# Patient Record
Sex: MALE | Race: BLACK OR AFRICAN AMERICAN | Employment: OTHER | URBAN - METROPOLITAN AREA
[De-identification: names, ages, dates, MRNs, and addresses within clinical notes are randomized per-mention and may not be internally consistent; named-entity substitution may affect disease eponyms.]

---

## 2023-03-16 ENCOUNTER — ANESTHESIA (OUTPATIENT)
Dept: ENDOSCOPY | Age: 68
End: 2023-03-16
Payer: MEDICARE

## 2023-03-16 ENCOUNTER — HOSPITAL ENCOUNTER (OUTPATIENT)
Age: 68
Setting detail: OUTPATIENT SURGERY
Discharge: HOME | End: 2023-03-16
Attending: INTERNAL MEDICINE | Admitting: INTERNAL MEDICINE
Payer: MEDICARE

## 2023-03-16 ENCOUNTER — ANESTHESIA EVENT (OUTPATIENT)
Dept: ENDOSCOPY | Age: 68
End: 2023-03-16
Payer: MEDICARE

## 2023-03-16 VITALS
RESPIRATION RATE: 14 BRPM | BODY MASS INDEX: 29.92 KG/M2 | DIASTOLIC BLOOD PRESSURE: 73 MMHG | OXYGEN SATURATION: 98 % | WEIGHT: 209 LBS | HEART RATE: 69 BPM | TEMPERATURE: 97.4 F | HEIGHT: 70 IN | SYSTOLIC BLOOD PRESSURE: 139 MMHG

## 2023-03-16 PROCEDURE — 74011250636 HC RX REV CODE- 250/636: Performed by: INTERNAL MEDICINE

## 2023-03-16 PROCEDURE — 74011250636 HC RX REV CODE- 250/636: Performed by: NURSE ANESTHETIST, CERTIFIED REGISTERED

## 2023-03-16 PROCEDURE — 74011000250 HC RX REV CODE- 250: Performed by: NURSE ANESTHETIST, CERTIFIED REGISTERED

## 2023-03-16 PROCEDURE — 76060000031 HC ANESTHESIA FIRST 0.5 HR: Performed by: INTERNAL MEDICINE

## 2023-03-16 PROCEDURE — 88305 TISSUE EXAM BY PATHOLOGIST: CPT

## 2023-03-16 PROCEDURE — 2709999900 HC NON-CHARGEABLE SUPPLY: Performed by: INTERNAL MEDICINE

## 2023-03-16 PROCEDURE — 77030013992 HC SNR POLYP ENDOSC BSC -B: Performed by: INTERNAL MEDICINE

## 2023-03-16 PROCEDURE — 76040000019: Performed by: INTERNAL MEDICINE

## 2023-03-16 RX ORDER — PROPOFOL 10 MG/ML
INJECTION, EMULSION INTRAVENOUS AS NEEDED
Status: DISCONTINUED | OUTPATIENT
Start: 2023-03-16 | End: 2023-03-16 | Stop reason: HOSPADM

## 2023-03-16 RX ORDER — FLUMAZENIL 0.1 MG/ML
0.2 INJECTION INTRAVENOUS
Status: DISCONTINUED | OUTPATIENT
Start: 2023-03-16 | End: 2023-03-16 | Stop reason: HOSPADM

## 2023-03-16 RX ORDER — LIDOCAINE HYDROCHLORIDE 20 MG/ML
INJECTION, SOLUTION EPIDURAL; INFILTRATION; INTRACAUDAL; PERINEURAL AS NEEDED
Status: DISCONTINUED | OUTPATIENT
Start: 2023-03-16 | End: 2023-03-16 | Stop reason: HOSPADM

## 2023-03-16 RX ORDER — ATROPINE SULFATE 0.1 MG/ML
0.5 INJECTION INTRAVENOUS
Status: DISCONTINUED | OUTPATIENT
Start: 2023-03-16 | End: 2023-03-16 | Stop reason: HOSPADM

## 2023-03-16 RX ORDER — DEXTROMETHORPHAN/PSEUDOEPHED 2.5-7.5/.8
1.2 DROPS ORAL
Status: DISCONTINUED | OUTPATIENT
Start: 2023-03-16 | End: 2023-03-16 | Stop reason: HOSPADM

## 2023-03-16 RX ORDER — EPINEPHRINE 0.1 MG/ML
1 INJECTION INTRACARDIAC; INTRAVENOUS
Status: DISCONTINUED | OUTPATIENT
Start: 2023-03-16 | End: 2023-03-16 | Stop reason: HOSPADM

## 2023-03-16 RX ORDER — MIDAZOLAM HYDROCHLORIDE 1 MG/ML
.25-5 INJECTION, SOLUTION INTRAMUSCULAR; INTRAVENOUS
Status: DISCONTINUED | OUTPATIENT
Start: 2023-03-16 | End: 2023-03-16 | Stop reason: HOSPADM

## 2023-03-16 RX ORDER — DIPHENHYDRAMINE HYDROCHLORIDE 50 MG/ML
50 INJECTION, SOLUTION INTRAMUSCULAR; INTRAVENOUS ONCE
Status: DISCONTINUED | OUTPATIENT
Start: 2023-03-16 | End: 2023-03-16 | Stop reason: HOSPADM

## 2023-03-16 RX ORDER — SODIUM CHLORIDE 9 MG/ML
125 INJECTION, SOLUTION INTRAVENOUS CONTINUOUS
Status: DISCONTINUED | OUTPATIENT
Start: 2023-03-16 | End: 2023-03-16 | Stop reason: HOSPADM

## 2023-03-16 RX ORDER — NALOXONE HYDROCHLORIDE 0.4 MG/ML
0.4 INJECTION, SOLUTION INTRAMUSCULAR; INTRAVENOUS; SUBCUTANEOUS
Status: DISCONTINUED | OUTPATIENT
Start: 2023-03-16 | End: 2023-03-16 | Stop reason: HOSPADM

## 2023-03-16 RX ADMIN — SODIUM CHLORIDE 125 ML/HR: 9 INJECTION, SOLUTION INTRAVENOUS at 14:28

## 2023-03-16 RX ADMIN — PROPOFOL 30 MG: 10 INJECTION, EMULSION INTRAVENOUS at 14:43

## 2023-03-16 RX ADMIN — PROPOFOL 50 MG: 10 INJECTION, EMULSION INTRAVENOUS at 14:38

## 2023-03-16 RX ADMIN — PROPOFOL 50 MG: 10 INJECTION, EMULSION INTRAVENOUS at 14:34

## 2023-03-16 RX ADMIN — LIDOCAINE HYDROCHLORIDE 20 MG: 20 INJECTION, SOLUTION EPIDURAL; INFILTRATION; INTRACAUDAL; PERINEURAL at 14:32

## 2023-03-16 RX ADMIN — PROPOFOL 50 MG: 10 INJECTION, EMULSION INTRAVENOUS at 14:33

## 2023-03-16 RX ADMIN — PROPOFOL 50 MG: 10 INJECTION, EMULSION INTRAVENOUS at 14:32

## 2023-03-16 RX ADMIN — PROPOFOL 50 MG: 10 INJECTION, EMULSION INTRAVENOUS at 14:35

## 2023-03-16 NOTE — DISCHARGE INSTRUCTIONS
Mckenzie Arya  [id number]  1955    It was my pleasure seeing you for your procedure. You will also receive a summary report with the findings from this procedure and any further recommendations. If you had polyps removed or biopsies taken during your procedure, you will receive a separate letter from me within the next 2 weeks. If you don't receive this letter or if you have any questions, please call my office [phone]. Please take note of the post procedure instructions listed below. Dr. Gordon Jay    These instructions give you information on caring for yourself after your procedure. Call your doctor if you have any problems or questions after your procedure. HOME CARE  Walk if you have belly cramping or gas. Walking will help get rid of the air and reduce the bloated feeling in your belly (abdomen). Your IV site (where you received drugs) may be tender to touch. Place warm towels on the site; keep your arm up on two pillows if you have any swelling or soreness in the area. You may shower. ACTIVITY:  Take frequent rest periods and move at a slower pace for the next 24 hours. .  You may resume your regular activity tomorrow if you are feeling back to normal.  Do not drive or ride a bicycle for at least 24 hours (because of the medicine (anesthesia) used during the test). Do not sign any important legal documents or use or operate any machinery for 24 hours  Do not take sleeping medicines/nerve drugs for 24 hours unless the doctor tells you. You can return to work/school tomorrow unless otherwise instructed. NUTRITION:  Drink plenty of fluids to keep your pee (urine) clear or pale yellow  Begin with a light meal and progress to your normal diet. Heavy or fried foods are harder to digest and may make you feel sick to your stomach (nauseated).   Once you are feeling back to normal, you may resume your normal diet as instructed by your doctor. Avoid alcoholic beverages for 24 hours or as instructed. IF YOU HAD BIOPSIES TAKEN OR POLYPS REMOVED DURING THE PROCEDURE:  For the next 7 days, avoid all non-steroidal antiinflammatory medications such as Ibuprofen, Motrin, Advil, Alleve, Ivory-seltzer, Goody's powder, BC powder. If you do not have an heart condition that requires you to take a daily aspirin, you should avoid taking aspirin for 7 days. Eat a soft diet for 24 hours. Monitor your stools for any blood or dark black, tar-like, stools as this may be a sign of bleeding and if you see any blood, notify your doctor immediately. GET HELP RIGHT AWAY AND SEEK IMMEDIATE MEDICAL CARE IF:  You have more than a spotting of blood in your stool. You pass clumps of tissue (blood clots) or fill the toilet with blood. Your belly is painfully swollen or puffy (abdominal distention). You throw up (vomit). You have a fever. You have redness, pain or swelling at the IV site that last greater than two days. You have abdominal pain or discomfort that is severe or gets worse throughout the day. Post-procedure diagnosis:  diverticulosis, polyps    Post-procedure recommendations:          Learning About Coronavirus (COVID-19)  Coronavirus [phone]): Overview  What is coronavirus (COVID-19)? The coronavirus disease (COVID-19) is caused by a virus. It is an illness that was first found in [place], [place], in December 2019. It has since spread worldwide. The virus can cause fever, cough, and trouble breathing. In severe cases, it can cause pneumonia and make it hard to breathe without help. It can cause death. Coronaviruses are a large group of viruses. They cause the common cold. They also cause more serious illnesses like Middle East respiratory syndrome (MERS) and severe acute respiratory syndrome (SARS). COVID-19 is caused by a novel coronavirus. That means it's a new type that has not been seen in people before.   This virus spreads person-to-person through droplets from coughing and sneezing. It can also spread when you are close to someone who is infected. And it can spread when you touch something that has the virus on it, such as a doorknob or a tabletop. What can you do to protect yourself from coronavirus (COVID-19)? The best way to protect yourself from getting sick is to: Avoid areas where there is an outbreak. Avoid contact with people who may be infected. Wash your hands often with soap or alcohol-based hand sanitizers. Avoid crowds and try to stay at least 6 feet away from other people. Wash your hands often, especially after you cough or sneeze. Use soap and water, and scrub for at least 20 seconds. If soap and water aren't available, use an alcohol-based hand . Avoid touching your mouth, nose, and eyes. What can you do to avoid spreading the virus to others? To help avoid spreading the virus to others:  Cover your mouth with a tissue when you cough or sneeze. Then throw the tissue in the trash. Use a disinfectant to clean things that you touch often. Stay home if you are sick or have been exposed to the virus. Don't go to school, work, or public areas. And don't use public transportation. If you are sick:  Leave your home only if you need to get medical care. But call the doctor's office first so they know you're coming. And wear a face mask, if you have one. If you have a face mask, wear it whenever you're around other people. It can help stop the spread of the virus when you cough or sneeze. Clean and disinfect your home every day. Use household  and disinfectant wipes or sprays. Take special care to clean things that you grab with your hands. These include doorknobs, remote controls, phones, and handles on your refrigerator and microwave. And don't forget countertops, tabletops, bathrooms, and computer keyboards.   When to call for help  Call 911 anytime you think you may need emergency care. For example, call if:  You have severe trouble breathing. (You can't talk at all.)  You have constant chest pain or pressure. You are severely dizzy or lightheaded. You are confused or can't think clearly. Your face and lips have a blue color. You pass out (lose consciousness) or are very hard to wake up. Call your doctor now if you develop symptoms such as:  Shortness of breath. Fever. Cough. If you need to get care, call ahead to the doctor's office for instructions before you go. Make sure you wear a face mask, if you have one, to prevent exposing other people to the virus. Where can you get the latest information? The following health organizations are tracking and studying this virus. Their websites contain the most up-to-date information. Aminah Tidwell also learn what to do if you think you may have been exposed to the virus. U.S. Centers for Disease Control and Prevention (CDC): The CDC provides updated news about the disease and travel advice. The website also tells you how to prevent the spread of infection. www.cdc.gov  World Health Organization [facility]): WHO offers information about the virus outbreaks. WHO also has travel advice. www.who.int  Current as of: April 1, 2020               Content Version: 12.4  © 2243-5983 [employer]. Care instructions adapted under license by your healthcare professional. If you have questions about a medical condition or this instruction, always ask your healthcare professional. [address] any warranty or liability for your use of this information.              Patient Education on Sedation / Analgesia Administered for Procedure      For 24 hours after general anesthesia or intravenous analgesia / sedation:  Have someone responsible help you with your care  Limit your activities  Do not drive and operate hazardous machinery  Do not make important personal, legal or business decisions  Do not drink alcoholic beverages  If you have not urinated within 8 hours after discharge, please contact your physician  Resume your medications unless otherwise instructed    For 24 hours after general anesthesia or intravenous analgesia / sedation  you may experience:  Drowsiness, dizziness, sleepiness, or confusion  Difficulty remembering or delayed reaction times  Difficulty with your balance, especially while walking, move slowly and carefully, do not make sudden position changes  Difficulty focusing or blurred vision    You may not be aware of slight changes in your behavior and/or your reaction time because of the medication used during and after your procedure.     Report the following to your physician:  Excessive pain, swelling, redness or odor of or around the surgical area  Temperature over 100.5  Nausea and vomiting lasting longer than 4 hours or if unable to take medications  Any signs of decreased circulation or nerve impairment to extremity: change in color, persistent numbness, tingling, coldness or increase pain  Any questions or concerns    IF YOU REPORT TO AN EMERGENCY ROOM, DOCTOR'S OFFICE OR HOSPITAL WITHIN 24 HOURS AFTER YOUR PROCEDURE, BRING THIS SHEET AND YOUR AFTER VISIT SUMMARY WITH YOU AND GIVE IT TO THE PHYSICIAN OR NURSE ATTENDING YOU.

## 2023-03-16 NOTE — ADDENDUM NOTE
Addendum  created 03/16/23 1701 by Opal Arias MD    Attestation recorded in [address] [place] [address], [facility] 97 filed

## 2023-03-16 NOTE — ADDENDUM NOTE
Addendum  created 03/16/23 1747 by Fanta Prabhakar MD    Attestation recorded in [address] [place] [address], [facility] 97 filed

## 2023-03-16 NOTE — PROCEDURES
Colonoscopy Procedure Note    Indications:   See Preoperative Diagnosis above  Referring Physician: Juan Davis NP  Anesthesia/Sedation: MAC anesthesia Propofol  Endoscopist:  Dr. Abi Ponce  Assistant:  Endoscopy Technician-1: Loretta Pham  Endoscopy RN-1: Jazmin Kelly RN  Preoperative diagnosis: Personal history of colonic polyps [Z86.010]  Postoperative diagnosis: diverticulosis, polyps    Procedure in Detail:  Informed consent was obtained for the procedure, including sedation. Risks of perforation, hemorrhage, adverse drug reaction, and aspiration were discussed. The patient was placed in the left lateral decubitus position. Based on the pre-procedure assessment, including review of the patient's medical history, medications, allergies, and review of systems, he had been deemed to be an appropriate candidate for moderate sedation; he was therefore sedated with the medications listed above. The patient was monitored continuously with ECG tracing, pulse oximetry, blood pressure monitoring, and direct observations. A rectal examination was performed. The [id number] was inserted into the rectum and advanced under direct vision to the terminal ileum. The quality of the colonic preparation was excellent BPS 9 after lavage. A careful inspection was made as the colonoscope was withdrawn, including a retroflexed view of the rectum; findings and interventions are described below. Appropriate photodocumentation was obtained.     Findings:  JACKELIN: unremarkable  Rectum: normal  no mucosal lesion appreciated  Sigmoid: few small diverticula, otherwise unremarkable  Descending Colon: normal  no mucosal lesion appreciated  Transverse Colon: two sessile polyps ranging 2-6mm, removed with cold snare polypectomy, retrieved, minimal bleeding  Ascending Colon: normal  no mucosal lesion appreciated  - few small diverticula  Cecum: one 3mm sessile polyp removed with cold snare polypectomy, retrieved, minimal bleeding  Terminal Ileum: normal  no mucosal lesion appreciated    EBL: minimal    Complications: None; patient tolerated the procedure well. Recommendations:     - Await pathology.   - Repeat colonoscopy based on path, likely 3 years  - Hold NSAIDs for 5 days  - discussed w/ Seale Pain      Signed By: Susanna Pappas MD                        March 16, 2023

## 2023-03-16 NOTE — H&P
[facility]  Outpatient History and Physical    Patient: Ismael Crouch    Physician: Julia Willoughby MD    Vital Signs: There were no vitals taken for this visit. Allergies: Allergies   Allergen Reactions    Iodine Anaphylaxis     \"Swelling & throat closes up\" per patient       Chief Complaint: screening colonoscopy    History:  Past Medical History:   Diagnosis Date    High cholesterol     Hypertension     LAP-BAND surgery status       Past Surgical History:   Procedure Laterality Date    HX GI        Social History     Socioeconomic History    Marital status:    Tobacco Use    Smoking status: Never   Substance and Sexual Activity    Alcohol use: No    Drug use: No      History reviewed. No pertinent family history. Medications:   Prior to Admission medications    Medication Sig Start Date End Date Taking? Authorizing Provider   ibuprofen (MOTRIN) 600 mg tablet Take 1 Tab by mouth every six (6) hours as needed for Pain. 4/10/14   Saroj Raza MD   HYDROcodone-acetaminophen (NORCO) 5-325 mg per tablet Take 1 Tab by mouth every four (4) hours as needed for Pain. 4/10/14   Saroj Raza MD   azithromycin (ZITHROMAX Z-DANY) 250 mg tablet Use as directed 3/26/14   Charmayne Ravel, MD   chlorpheniramine-HYDROcodone [facility]) 8-10 mg/5 mL suspension Take 5 mL by mouth every twelve (12) hours as needed for Cough. 3/26/14   Charmayne Ravel, MD   albuterol (PROVENTIL HFA, VENTOLIN HFA) 90 mcg/actuation inhaler Take 2 Puffs by inhalation every four (4) hours as needed for Wheezing. 3/26/14   Charmayne Ravel, MD   pioglitazone (ACTOS) 30 mg tablet Take 30 mg by mouth daily. Lizzie Boyd MD   potassium chloride SA (KLOR-CON M15) 15 mEq tablet Take 20 mEq by mouth daily. Lizzie Boyd MD   aspirin 81 mg tablet Take 81 mg by mouth daily.     Lizzie Boyd MD   EPINEPHrine (EPIPEN 2-DANY) 0.3 mg/0.3 mL (1:1,000) injection 0.3 mL by IntraMUSCular route once as needed for Anaphylaxis for 1 dose. 8/25/13   Cathy Allen MD   atorvastatin (LIPITOR) 20 mg tablet Take 20 mg by mouth daily. Indications: HYPERCHOLESTEROLEMIA    Rojelio Glez MD   indapamide (LOZOL) 2.5 mg tablet Take 2.5 mg by mouth daily. Harriet Councilman., MD   enalapril (VASOTEC) 20 mg tablet Take 20 mg by mouth daily. Indications: Prachi Rios MD       Physical Exam:   General: alert, no distress   HEENT: Head: Normocephalic, no lesions, without obvious abnormality. Heart: regular rate and rhythm, S1, S2 normal, no murmur, click, rub or gallop   Lungs: chest clear, no wheezing, rales, normal symmetric air entry   Abdominal: Bowel sounds are normal, liver is not enlarged, spleen is not enlarged   Neurological: Grossly normal   Extremities: extremities normal, atraumatic, no cyanosis or edema     Plan of Care/Planned Procedure: colonoscopy    The heart, lungs and mental status were satisfactory for the administration of MAC sedation and for the procedure. Informed consent was obtained for the procedure, including sedation. Risks of perforation, hemorrhage, adverse drug reaction, and aspiration were discussed. The risks, benefits and alternatives were again reiterated to the patient to include the risk of infection, bleeding, medication reaction, aspiration, perforation which could require immediate surgery, cardiopulmonary complication, issues with anesthesia and death. The patient was counseled at length about the risks of kieran Covid-19 in the vandana-operative and post-operative states including the recovery window of their procedure. The patient was made aware that kieran Covid-19 after a surgical procedure may worsen their prognosis for recovering from the virus and lend to a higher morbidity and or mortality risk. The patient was given the options of postponing their procedure.  All of the risks, benefits, and alternatives were discussed. The patient does  wish to proceed with the procedure.

## 2023-03-16 NOTE — ANESTHESIA PREPROCEDURE EVALUATION
Relevant Problems   No relevant active problems       Anesthetic History   No history of anesthetic complications            Review of Systems / Medical History  Patient summary reviewed, nursing notes reviewed and pertinent labs reviewed    Pulmonary  Within defined limits                 Neuro/Psych   Within defined limits           Cardiovascular    Hypertension          Hyperlipidemia    Exercise tolerance: >4 METS  Comments: ECG (4/10/14):   Normal sinus rhythm  When compared with ECG of 06-AUG-2008 14:43,  Previous ECG has undetermined rhythm, needs review  QRS duration has decreased   GI/Hepatic/Renal               Comments: Personal history of colonic polyps     Hx Lap-band Endo/Other  Within defined limits           Other Findings            Physical Exam    Airway  Mallampati: I  TM Distance: 4 - 6 cm  Neck ROM: normal range of motion   Mouth opening: Normal     Cardiovascular  Regular rate and rhythm,  S1 and S2 normal,  no murmur, click, rub, or gallop             Dental  No notable dental hx       Pulmonary  Breath sounds clear to auscultation               Abdominal  GI exam deferred       Other Findings            Anesthetic Plan    ASA: 2  Anesthesia type: MAC          Induction: Intravenous  Anesthetic plan and risks discussed with: Patient

## 2023-03-16 NOTE — ROUTINE PROCESS
TRANSFER - IN REPORT:    Verbal report received from Meg(name) on [facility] Anties  being received from endo(unit) for routine progression of care      Report consisted of patients Situation, Background, Assessment and   Recommendations(SBAR). Information from the following report(s) SBAR, Procedure Summary, and MAR was reviewed with the receiving nurse. Opportunity for questions and clarification was provided. Assessment completed upon patients arrival to unit and care assumed.

## 2023-03-16 NOTE — PERIOP NOTES
Endoscopy Case End Note:    1450:  Procedure scope was pre-cleaned, per protocol, at bedside by Kayleigh Velasquez. 1453:  Report received from anesthesia - M Akash.  See anesthesia flowsheet for intra-procedure vital signs and events. 1453:  Glasses returned to patient.

## 2023-07-18 ENCOUNTER — APPOINTMENT (OUTPATIENT)
Facility: HOSPITAL | Age: 68
End: 2023-07-18
Payer: MEDICARE

## 2023-07-18 ENCOUNTER — HOSPITAL ENCOUNTER (EMERGENCY)
Facility: HOSPITAL | Age: 68
Discharge: HOME | End: 2023-07-18
Attending: EMERGENCY MEDICINE
Payer: MEDICARE

## 2023-07-18 VITALS
TEMPERATURE: 98 F | RESPIRATION RATE: 18 BRPM | HEART RATE: 75 BPM | OXYGEN SATURATION: 98 % | DIASTOLIC BLOOD PRESSURE: 69 MMHG | SYSTOLIC BLOOD PRESSURE: 130 MMHG

## 2023-07-18 DIAGNOSIS — K59.00 CONSTIPATION, UNSPECIFIED CONSTIPATION TYPE: ICD-10-CM

## 2023-07-18 DIAGNOSIS — R10.84 GENERALIZED ABDOMINAL PAIN: Primary | ICD-10-CM

## 2023-07-18 LAB
ALBUMIN SERPL-MCNC: 3.4 G/DL (ref 3.5–5)
ALBUMIN/GLOB SERPL: 1 (ref 1.1–2.2)
ALP SERPL-CCNC: 56 U/L (ref 45–117)
ALT SERPL-CCNC: 28 U/L (ref 12–78)
ANION GAP SERPL CALC-SCNC: 4 MMOL/L (ref 5–15)
APPEARANCE UR: CLEAR
AST SERPL-CCNC: 20 U/L (ref 15–37)
BACTERIA URNS QL MICRO: NEGATIVE /HPF
BASOPHILS # BLD: 0 K/UL (ref 0–0.1)
BASOPHILS NFR BLD: 1 % (ref 0–1)
BILIRUB SERPL-MCNC: 0.6 MG/DL (ref 0.2–1)
BILIRUB UR QL: NEGATIVE
BUN SERPL-MCNC: 15 MG/DL (ref 6–20)
BUN/CREAT SERPL: 15 (ref 12–20)
CALCIUM SERPL-MCNC: 9 MG/DL (ref 8.5–10.1)
CHLORIDE SERPL-SCNC: 104 MMOL/L (ref 97–108)
CO2 SERPL-SCNC: 30 MMOL/L (ref 21–32)
COLOR UR: NORMAL
COMMENT:: NORMAL
CREAT SERPL-MCNC: 1 MG/DL (ref 0.7–1.3)
DIFFERENTIAL METHOD BLD: NORMAL
EOSINOPHIL # BLD: 0.1 K/UL (ref 0–0.4)
EOSINOPHIL NFR BLD: 1 % (ref 0–7)
EPITH CASTS URNS QL MICRO: NORMAL /LPF
ERYTHROCYTE [DISTWIDTH] IN BLOOD BY AUTOMATED COUNT: 13.5 % (ref 11.5–14.5)
GLOBULIN SER CALC-MCNC: 3.5 G/DL (ref 2–4)
GLUCOSE SERPL-MCNC: 130 MG/DL (ref 65–100)
GLUCOSE UR STRIP.AUTO-MCNC: NEGATIVE MG/DL
HCT VFR BLD AUTO: 42.3 % (ref 36.6–50.3)
HGB BLD-MCNC: 13.3 G/DL (ref 12.1–17)
HGB UR QL STRIP: NEGATIVE
HYALINE CASTS URNS QL MICRO: NORMAL /LPF (ref 0–5)
IMM GRANULOCYTES # BLD AUTO: 0 K/UL (ref 0–0.04)
IMM GRANULOCYTES NFR BLD AUTO: 0 % (ref 0–0.5)
KETONES UR QL STRIP.AUTO: NEGATIVE MG/DL
LEUKOCYTE ESTERASE UR QL STRIP.AUTO: NEGATIVE
LIPASE SERPL-CCNC: 86 U/L (ref 73–393)
LYMPHOCYTES # BLD: 1.2 K/UL (ref 0.8–3.5)
LYMPHOCYTES NFR BLD: 18 % (ref 12–49)
MCH RBC QN AUTO: 27.6 PG (ref 26–34)
MCHC RBC AUTO-ENTMCNC: 31.4 G/DL (ref 30–36.5)
MCV RBC AUTO: 87.8 FL (ref 80–99)
MONOCYTES # BLD: 0.5 K/UL (ref 0–1)
MONOCYTES NFR BLD: 7 % (ref 5–13)
NEUTS SEG # BLD: 4.9 K/UL (ref 1.8–8)
NEUTS SEG NFR BLD: 73 % (ref 32–75)
NITRITE UR QL STRIP.AUTO: NEGATIVE
NRBC # BLD: 0 K/UL (ref 0–0.01)
NRBC BLD-RTO: 0 PER 100 WBC
PH UR STRIP: 5.5 (ref 5–8)
PLATELET # BLD AUTO: 195 K/UL (ref 150–400)
PMV BLD AUTO: 9.8 FL (ref 8.9–12.9)
POTASSIUM SERPL-SCNC: 3.9 MMOL/L (ref 3.5–5.1)
PROT SERPL-MCNC: 6.9 G/DL (ref 6.4–8.2)
PROT UR STRIP-MCNC: NEGATIVE MG/DL
RBC # BLD AUTO: 4.82 M/UL (ref 4.1–5.7)
RBC #/AREA URNS HPF: NORMAL /HPF (ref 0–5)
SODIUM SERPL-SCNC: 138 MMOL/L (ref 136–145)
SP GR UR REFRACTOMETRY: 1.02 (ref 1–1.03)
SPECIMEN HOLD: NORMAL
SPECIMEN HOLD: NORMAL
UROBILINOGEN UR QL STRIP.AUTO: 1 EU/DL (ref 0.2–1)
WBC # BLD AUTO: 6.7 K/UL (ref 4.1–11.1)
WBC URNS QL MICRO: NORMAL /HPF (ref 0–4)

## 2023-07-18 PROCEDURE — 83690 ASSAY OF LIPASE: CPT

## 2023-07-18 PROCEDURE — 81001 URINALYSIS AUTO W/SCOPE: CPT

## 2023-07-18 PROCEDURE — 99284 EMERGENCY DEPT VISIT MOD MDM: CPT

## 2023-07-18 PROCEDURE — 74176 CT ABD & PELVIS W/O CONTRAST: CPT

## 2023-07-18 PROCEDURE — 85025 COMPLETE CBC W/AUTO DIFF WBC: CPT

## 2023-07-18 PROCEDURE — 36415 COLL VENOUS BLD VENIPUNCTURE: CPT

## 2023-07-18 PROCEDURE — 80053 COMPREHEN METABOLIC PANEL: CPT

## 2023-07-18 ASSESSMENT — ENCOUNTER SYMPTOMS
EYE PAIN: 0
SORE THROAT: 0
BACK PAIN: 1
NAUSEA: 0
COUGH: 0
ABDOMINAL PAIN: 1
VOMITING: 0
DIARRHEA: 0
SHORTNESS OF BREATH: 0

## 2023-07-18 ASSESSMENT — PAIN SCALES - GENERAL: PAINLEVEL_OUTOF10: 5

## 2023-07-18 ASSESSMENT — PAIN - FUNCTIONAL ASSESSMENT: PAIN_FUNCTIONAL_ASSESSMENT: 0-10

## 2023-07-18 ASSESSMENT — PAIN DESCRIPTION - DESCRIPTORS: DESCRIPTORS: ACHING

## 2023-07-18 ASSESSMENT — PAIN DESCRIPTION - LOCATION: LOCATION: ABDOMEN

## 2023-07-18 NOTE — ED TRIAGE NOTES
Pt reports abdominal and back pain x3 days with accompanying constipation. Pt's LBM was today but small.  Pt denies n/v/d.

## 2023-07-18 NOTE — ED NOTES
Discharge paperwork reviewed with pt & questions answered by Keisha Perkins NP. IV taken out. Pt walked off unit in no apparent acute distress.      Joseph Felton RN  07/18/23 6026

## 2023-07-18 NOTE — DISCHARGE INSTRUCTIONS
Continue to monitor symptoms at home. Get magnesium citrate OTC and drink entire bottle in one sitting. Can also take 1 capful of Miralax three times per day. You can typically expect a bowel movement in 1-3 days. Stay hydrated and eat plenty of fiber including raw fruits, vegetables and whole grains. Return with any changes or worsening. Follow up with PCP and GI.

## 2023-07-18 NOTE — ED PROVIDER NOTES
otherwise. Patient did not anything for pain while in ED. Suspect constipation is etiology of majority of patient's symptoms. Results and findings have been communicated and explained thoroughly to patient and family members. Patient has been educated on strict return precautions as well as instructions for conservative care and follow-up. Patient will be discharged with prescription for miralax and has been given opportunity to ask questions about this new medication. Patient verbalizes understanding and no socioeconomic barriers to care were identified during this visit. Patient expresses no further concerns at this time and will be discharged with AVS and education paperwork. Amount and/or Complexity of Data Reviewed  Labs: ordered. Radiology: ordered. REASSESSMENT            CONSULTS:  None    PROCEDURES:  Unless otherwise noted below, none     Procedures      FINAL IMPRESSION      1. Generalized abdominal pain    2. Constipation, unspecified constipation type          DISPOSITION/PLAN   DISPOSITION Decision To Discharge 07/18/2023 05:39:22 PM      PATIENT REFERRED TO:  [place] [address]  [phone]  Schedule an appointment as soon as possible for a visit       [facility] EMERGENCY [address] [facility]  [phone]  Go to   If symptoms worsen or change    Elsy Turcios MD  [address] [facility], [address]  [phone]    Schedule an appointment as soon as possible for a visit         DISCHARGE MEDICATIONS:  There are no discharge medications for this patient.         (Please note that portions of this note were completed with a voice recognition program.  Efforts were made to edit the dictations but occasionally words are mis-transcribed.)    LISA Cardona (electronically signed)  Emergency Attending Physician / Physician Assistant / Nurse Practitioner              Mae Arguello

## 2024-12-18 ENCOUNTER — APPOINTMENT (OUTPATIENT)
Facility: HOSPITAL | Age: 69
End: 2024-12-18
Payer: MEDICARE

## 2024-12-18 ENCOUNTER — HOSPITAL ENCOUNTER (EMERGENCY)
Facility: HOSPITAL | Age: 69
Discharge: HOME | End: 2024-12-18
Attending: STUDENT IN AN ORGANIZED HEALTH CARE EDUCATION/TRAINING PROGRAM
Payer: MEDICARE

## 2024-12-18 VITALS
DIASTOLIC BLOOD PRESSURE: 78 MMHG | TEMPERATURE: 98.8 F | HEIGHT: 70 IN | WEIGHT: 213.63 LBS | OXYGEN SATURATION: 99 % | HEART RATE: 60 BPM | SYSTOLIC BLOOD PRESSURE: 124 MMHG | BODY MASS INDEX: 30.58 KG/M2 | RESPIRATION RATE: 18 BRPM

## 2024-12-18 DIAGNOSIS — J11.1 INFLUENZA: Primary | ICD-10-CM

## 2024-12-18 DIAGNOSIS — E87.6 HYPOKALEMIA: ICD-10-CM

## 2024-12-18 LAB
ALBUMIN SERPL-MCNC: 3.3 G/DL (ref 3.5–5)
ALBUMIN/GLOB SERPL: 0.8 (ref 1.1–2.2)
ALP SERPL-CCNC: 60 U/L (ref 45–117)
ALT SERPL-CCNC: 32 U/L (ref 12–78)
ANION GAP SERPL CALC-SCNC: 7 MMOL/L (ref 2–12)
AST SERPL-CCNC: ABNORMAL U/L (ref 15–37)
BASOPHILS # BLD: 0 K/UL (ref 0–0.1)
BASOPHILS NFR BLD: 1 % (ref 0–1)
BILIRUB SERPL-MCNC: 0.8 MG/DL (ref 0.2–1)
BUN SERPL-MCNC: 23 MG/DL (ref 6–20)
BUN/CREAT SERPL: 15 (ref 12–20)
CALCIUM SERPL-MCNC: 8.8 MG/DL (ref 8.5–10.1)
CHLORIDE SERPL-SCNC: 95 MMOL/L (ref 97–108)
CO2 SERPL-SCNC: 30 MMOL/L (ref 21–32)
COMMENT:: NORMAL
COMMENT:: NORMAL
CREAT SERPL-MCNC: 1.54 MG/DL (ref 0.7–1.3)
DIFFERENTIAL METHOD BLD: ABNORMAL
EOSINOPHIL # BLD: 0 K/UL (ref 0–0.4)
EOSINOPHIL NFR BLD: 1 % (ref 0–7)
ERYTHROCYTE [DISTWIDTH] IN BLOOD BY AUTOMATED COUNT: 13.2 % (ref 11.5–14.5)
FLUAV RNA SPEC QL NAA+PROBE: DETECTED
FLUBV RNA SPEC QL NAA+PROBE: NOT DETECTED
GLOBULIN SER CALC-MCNC: 4.2 G/DL (ref 2–4)
GLUCOSE SERPL-MCNC: 179 MG/DL (ref 65–100)
HCT VFR BLD AUTO: 42.5 % (ref 36.6–50.3)
HGB BLD-MCNC: 14.1 G/DL (ref 12.1–17)
IMM GRANULOCYTES # BLD AUTO: 0 K/UL (ref 0–0.04)
IMM GRANULOCYTES NFR BLD AUTO: 1 % (ref 0–0.5)
LYMPHOCYTES # BLD: 0.8 K/UL (ref 0.8–3.5)
LYMPHOCYTES NFR BLD: 12 % (ref 12–49)
MAGNESIUM SERPL-MCNC: 1.8 MG/DL (ref 1.6–2.4)
MAGNESIUM SERPL-MCNC: NORMAL MG/DL (ref 1.6–2.4)
MCH RBC QN AUTO: 28.2 PG (ref 26–34)
MCHC RBC AUTO-ENTMCNC: 33.2 G/DL (ref 30–36.5)
MCV RBC AUTO: 85 FL (ref 80–99)
MONOCYTES # BLD: 0.4 K/UL (ref 0–1)
MONOCYTES NFR BLD: 5 % (ref 5–13)
NEUTS SEG # BLD: 5.7 K/UL (ref 1.8–8)
NEUTS SEG NFR BLD: 80 % (ref 32–75)
NRBC # BLD: 0 K/UL (ref 0–0.01)
NRBC BLD-RTO: 0 PER 100 WBC
PLATELET # BLD AUTO: 186 K/UL (ref 150–400)
PMV BLD AUTO: 10 FL (ref 8.9–12.9)
POTASSIUM SERPL-SCNC: 3 MMOL/L (ref 3.5–5.1)
POTASSIUM SERPL-SCNC: ABNORMAL MMOL/L (ref 3.5–5.1)
PROT SERPL-MCNC: 7.5 G/DL (ref 6.4–8.2)
RBC # BLD AUTO: 5 M/UL (ref 4.1–5.7)
SARS-COV-2 RNA RESP QL NAA+PROBE: NOT DETECTED
SODIUM SERPL-SCNC: 132 MMOL/L (ref 136–145)
SOURCE: ABNORMAL
SPECIMEN HOLD: NORMAL
WBC # BLD AUTO: 7 K/UL (ref 4.1–11.1)

## 2024-12-18 PROCEDURE — 85025 COMPLETE CBC W/AUTO DIFF WBC: CPT

## 2024-12-18 PROCEDURE — 99285 EMERGENCY DEPT VISIT HI MDM: CPT

## 2024-12-18 PROCEDURE — 36415 COLL VENOUS BLD VENIPUNCTURE: CPT

## 2024-12-18 PROCEDURE — 84132 ASSAY OF SERUM POTASSIUM: CPT

## 2024-12-18 PROCEDURE — 6370000000 HC RX 637 (ALT 250 FOR IP): Performed by: STUDENT IN AN ORGANIZED HEALTH CARE EDUCATION/TRAINING PROGRAM

## 2024-12-18 PROCEDURE — 87636 SARSCOV2 & INF A&B AMP PRB: CPT

## 2024-12-18 PROCEDURE — 83735 ASSAY OF MAGNESIUM: CPT

## 2024-12-18 PROCEDURE — 71046 X-RAY EXAM CHEST 2 VIEWS: CPT

## 2024-12-18 PROCEDURE — 80053 COMPREHEN METABOLIC PANEL: CPT

## 2024-12-18 RX ORDER — POTASSIUM CHLORIDE 750 MG/1
40 TABLET, EXTENDED RELEASE ORAL ONCE
Status: COMPLETED | OUTPATIENT
Start: 2024-12-18 | End: 2024-12-18

## 2024-12-18 RX ORDER — ACETAMINOPHEN 500 MG
1000 TABLET ORAL
Status: COMPLETED | OUTPATIENT
Start: 2024-12-18 | End: 2024-12-18

## 2024-12-18 RX ORDER — IBUPROFEN 600 MG/1
600 TABLET, FILM COATED ORAL
Status: COMPLETED | OUTPATIENT
Start: 2024-12-18 | End: 2024-12-18

## 2024-12-18 RX ORDER — GUAIFENESIN 600 MG/1
600 TABLET, EXTENDED RELEASE ORAL ONCE
Status: COMPLETED | OUTPATIENT
Start: 2024-12-18 | End: 2024-12-18

## 2024-12-18 RX ADMIN — GUAIFENESIN 600 MG: 600 TABLET, EXTENDED RELEASE ORAL at 17:54

## 2024-12-18 RX ADMIN — ACETAMINOPHEN 1000 MG: 500 TABLET ORAL at 17:53

## 2024-12-18 RX ADMIN — IBUPROFEN 600 MG: 600 TABLET, FILM COATED ORAL at 17:55

## 2024-12-18 RX ADMIN — POTASSIUM CHLORIDE 40 MEQ: 750 TABLET, EXTENDED RELEASE ORAL at 18:05

## 2024-12-18 ASSESSMENT — PAIN SCALES - GENERAL
PAINLEVEL_OUTOF10: 5
PAINLEVEL_OUTOF10: 5
PAINLEVEL_OUTOF10: 4
PAINLEVEL_OUTOF10: 7

## 2024-12-18 ASSESSMENT — PAIN DESCRIPTION - DESCRIPTORS
DESCRIPTORS: ACHING
DESCRIPTORS: ACHING;SORE
DESCRIPTORS: ACHING
DESCRIPTORS: ACHING

## 2024-12-18 ASSESSMENT — PAIN DESCRIPTION - LOCATION
LOCATION: CHEST
LOCATION: ABDOMEN;CHEST
LOCATION: CHEST
LOCATION: RIB CAGE

## 2024-12-18 ASSESSMENT — PAIN DESCRIPTION - ONSET
ONSET: ON-GOING
ONSET: SUDDEN

## 2024-12-18 ASSESSMENT — PAIN - FUNCTIONAL ASSESSMENT
PAIN_FUNCTIONAL_ASSESSMENT: PREVENTS OR INTERFERES SOME ACTIVE ACTIVITIES AND ADLS
PAIN_FUNCTIONAL_ASSESSMENT: PREVENTS OR INTERFERES SOME ACTIVE ACTIVITIES AND ADLS
PAIN_FUNCTIONAL_ASSESSMENT: 0-10
PAIN_FUNCTIONAL_ASSESSMENT: 0-10
PAIN_FUNCTIONAL_ASSESSMENT: ACTIVITIES ARE NOT PREVENTED
PAIN_FUNCTIONAL_ASSESSMENT: ACTIVITIES ARE NOT PREVENTED

## 2024-12-18 ASSESSMENT — ENCOUNTER SYMPTOMS
SHORTNESS OF BREATH: 1
VOMITING: 0
COUGH: 1

## 2024-12-18 ASSESSMENT — PAIN DESCRIPTION - ORIENTATION
ORIENTATION: RIGHT;LEFT
ORIENTATION: MID

## 2024-12-18 ASSESSMENT — PAIN DESCRIPTION - PAIN TYPE
TYPE: ACUTE PAIN
TYPE: ACUTE PAIN

## 2024-12-18 ASSESSMENT — PAIN DESCRIPTION - FREQUENCY
FREQUENCY: CONTINUOUS
FREQUENCY: CONTINUOUS

## 2024-12-18 NOTE — ED TRIAGE NOTES
Pt arrives c/o SOB, rib pain and generalized weakness that started 2 days ago. +congestion +cough Denies fevers.

## 2024-12-18 NOTE — ED PROVIDER NOTES
coughing throughout exam.  No respiratory distress and lungs are in's are clear.  Differential diagnosis includes, but not limited to, pneumonia, COVID, flu, other viral upper respiratory illness.  Doubt ACS given no central chest pain and he has associated upper respiratory illness.  Also doubt PE given no risk factors, not tachycardic nor hypoxia and Wells score of 0.    Labs reviewed.  CBC without leukocytosis or anemia.  Electrolytes notable for hypokalemia, potassium is 3, oral potassium replenishment given.  Mild hyponatremia, sodium is 132.  BUN is 23 and creatinine 1.54.  Patient tested positive for influenza A.  COVID-negative.  Chest x-ray negative for pneumonia.    Given symptom onset greater than 48 hours, I feel side effects of Tamiflu outweigh benefits and discussed this with the patient.          Amount and/or Complexity of Data Reviewed  Labs: ordered. Decision-making details documented in ED Course.  Radiology: ordered. Decision-making details documented in ED Course.  ECG/medicine tests: ordered. Decision-making details documented in ED Course.    Risk  OTC drugs.  Prescription drug management.        CONSULTS:  None    PROCEDURES:  Unless otherwise noted below, none     Procedures    REASSESSMENT       All available radiology and laboratory results have been reviewed with patient and/or available family.  Patient and/or family verbally conveyed their understanding and agreement of the patient's signs, symptoms, diagnosis, treatment and prognosis and additionally agree to follow-up as recommended in the discharge instructions or to return to the Emergency Department should their condition change or worsen prior to their follow-up appointment.  All questions have been answered and patient and/or available family express understanding.          FINAL IMPRESSION      1. Influenza    2. Hypokalemia          DISPOSITION/PLAN   DISPOSITION Decision To Discharge 12/18/2024 06:12:49 PM      PATIENT

## 2025-05-18 ENCOUNTER — APPOINTMENT (OUTPATIENT)
Facility: HOSPITAL | Age: 70
End: 2025-05-18
Payer: MEDICARE

## 2025-05-18 ENCOUNTER — HOSPITAL ENCOUNTER (EMERGENCY)
Facility: HOSPITAL | Age: 70
Discharge: HOME | End: 2025-05-18
Attending: EMERGENCY MEDICINE
Payer: MEDICARE

## 2025-05-18 VITALS
HEART RATE: 50 BPM | RESPIRATION RATE: 18 BRPM | TEMPERATURE: 98.1 F | BODY MASS INDEX: 30.84 KG/M2 | SYSTOLIC BLOOD PRESSURE: 134 MMHG | OXYGEN SATURATION: 100 % | HEIGHT: 70 IN | WEIGHT: 215.39 LBS | DIASTOLIC BLOOD PRESSURE: 66 MMHG

## 2025-05-18 DIAGNOSIS — S20.211A CONTUSION OF RIB ON RIGHT SIDE, INITIAL ENCOUNTER: Primary | ICD-10-CM
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
## 2025-05-18 PROCEDURE — 6360000002 HC RX W HCPCS

## 2025-05-18 PROCEDURE — 96372 THER/PROPH/DIAG INJ SC/IM: CPT

## 2025-05-18 PROCEDURE — 6370000000 HC RX 637 (ALT 250 FOR IP)

## 2025-05-18 PROCEDURE — 99284 EMERGENCY DEPT VISIT MOD MDM: CPT

## 2025-05-18 PROCEDURE — 71250 CT THORAX DX C-: CPT

## 2025-05-18 RX ORDER — KETOROLAC TROMETHAMINE 30 MG/ML
30 INJECTION, SOLUTION INTRAMUSCULAR; INTRAVENOUS ONCE
Status: COMPLETED | OUTPATIENT
Start: 2025-05-18 | End: 2025-05-18

## 2025-05-18 RX ORDER — METHOCARBAMOL 500 MG/1
1000 TABLET, FILM COATED ORAL ONCE
Status: COMPLETED | OUTPATIENT
Start: 2025-05-18 | End: 2025-05-18

## 2025-05-18 RX ORDER — CYCLOBENZAPRINE HCL 10 MG
10 TABLET ORAL 3 TIMES DAILY PRN
Qty: 21 TABLET | Refills: 0 | Status: SHIPPED | OUTPATIENT
Start: 2025-05-18 | End: 2025-05-28

## 2025-05-18 RX ORDER — IBUPROFEN 600 MG/1
600 TABLET, FILM COATED ORAL 3 TIMES DAILY PRN
Qty: 30 TABLET | Refills: 0 | Status: SHIPPED | OUTPATIENT
Start: 2025-05-18

## 2025-05-18 RX ORDER — LIDOCAINE 4 G/G
1 PATCH TOPICAL DAILY
Qty: 30 PATCH | Refills: 0 | Status: SHIPPED | OUTPATIENT
Start: 2025-05-18 | End: 2025-06-17

## 2025-05-18 RX ORDER — LIDOCAINE 4 G/G
1 PATCH TOPICAL ONCE
Status: DISCONTINUED | OUTPATIENT
Start: 2025-05-18 | End: 2025-05-18 | Stop reason: HOSPADM

## 2025-05-18 RX ADMIN — KETOROLAC TROMETHAMINE 30 MG: 30 INJECTION, SOLUTION INTRAMUSCULAR; INTRAVENOUS at 09:52

## 2025-05-18 RX ADMIN — METHOCARBAMOL 1000 MG: 500 TABLET ORAL at 09:51

## 2025-05-18 ASSESSMENT — PAIN - FUNCTIONAL ASSESSMENT: PAIN_FUNCTIONAL_ASSESSMENT: 0-10

## 2025-05-18 ASSESSMENT — PAIN DESCRIPTION - LOCATION: LOCATION: SHOULDER;RIB CAGE;KNEE

## 2025-05-18 ASSESSMENT — LIFESTYLE VARIABLES
HOW MANY STANDARD DRINKS CONTAINING ALCOHOL DO YOU HAVE ON A TYPICAL DAY: PATIENT DOES NOT DRINK
HOW OFTEN DO YOU HAVE A DRINK CONTAINING ALCOHOL: NEVER

## 2025-05-18 ASSESSMENT — PAIN DESCRIPTION - ORIENTATION
ORIENTATION: RIGHT
ORIENTATION: RIGHT

## 2025-05-18 ASSESSMENT — PAIN DESCRIPTION - DESCRIPTORS: DESCRIPTORS: SORE

## 2025-05-18 ASSESSMENT — PAIN SCALES - GENERAL: PAINLEVEL_OUTOF10: 8

## 2025-05-18 NOTE — ED TRIAGE NOTES
Patient came in after fall yesterday. States he fell on right side in pot hole. Denies LOC. Pain is in his right shoulder down to ribs and knees. Took Ibuprofen and it helped some. Denies SOB, CP, head injury.

## 2025-05-18 NOTE — DISCHARGE INSTRUCTIONS
As discussed today, CT of the chest did not show any injury to the ribs or lungs.  I most likely suspect you have a rib contusion due to a ground-level fall.  Please apply lidocaine patches to the ribs in 12-hour intervals and take Robaxin up to 4 times a day.  You may take ibuprofen every 6-8 hours for pain control. Please follow-up with your primary care doctor as needed.

## 2025-05-18 NOTE — ED PROVIDER NOTES
[facility] EMERGENCY DEPARTMENT  EMERGENCY DEPARTMENT ENCOUNTER      Pt Name: Rito Granger  MRN: [id number]  Birthdate 1955  Date of evaluation: 5/18/2025  Provider: LISA Carlos    CHIEF COMPLAINT       Chief Complaint   Patient presents with    Fall         HISTORY OF PRESENT ILLNESS   (Location/Symptom, Timing/Onset, Context/Setting, Quality, Duration, Modifying Factors, Severity)  Note limiting factors.   Rito Granger is a 69 y.o. male who presents to the emergency department complaining of ground-level fall occurring yesterday when he stepped into a pothole and fell to the ground onto his right side.  Denies any head injury or loss of consciousness.  Reports having pain to the right side of his ribs that increases with sneezing and movement.  Reports current pain level is 8 out of 10.  Reports taking ibuprofen at home with some relief.  Denies any shortness of breath, difficulty breathing, chest pain, neck pain, visual disturbances, headache.                Review of External Medical Records:     Nursing Notes were reviewed.    REVIEW OF SYSTEMS    (2-9 systems for level 4, 10 or more for level 5)     Review of Systems    Except as noted above the remainder of the review of systems was reviewed and negative.       PAST MEDICAL HISTORY     Past Medical History:   Diagnosis Date    High cholesterol     Hypertension     LAP-BAND surgery status          SURGICAL HISTORY       Past Surgical History:   Procedure Laterality Date    COLONOSCOPY N/A 3/16/2023    COLONOSCOPY performed by Duane Harrell MD at [facility] ENDOSCOPY    GI           CURRENT MEDICATIONS       Discharge Medication List as of 5/18/2025 10:22 AM          ALLERGIES     Shellfish-derived products and Iodine    FAMILY HISTORY     No family history on file.       SOCIAL HISTORY       Social History     Socioeconomic History    Marital status:    Tobacco Use    Smoking status: Never   Substance and Sexual Activity

## (undated) DEVICE — ELECTRODE,RADIOTRANSLUCENT,FOAM,5PK: Brand: MEDLINE

## (undated) DEVICE — Device

## (undated) DEVICE — SYR 3ML LL TIP 1/10ML GRAD --

## (undated) DEVICE — TRAP,MUCUS SPECIMEN, 80CC: Brand: MEDLINE

## (undated) DEVICE — BASIN EMSIS 16OZ GRAPHITE PLAS KID SHP MOLD GRAD FOR ORAL

## (undated) DEVICE — SYR 10ML LUER LOK 1/5ML GRAD --

## (undated) DEVICE — NEONATAL-ADULT SPO2 SENSOR: Brand: NELLCOR

## (undated) DEVICE — SNARE ENDOSCP M L240CM W27MM SHTH DIA2.4MM CHN 2.8MM OVL

## (undated) DEVICE — Z DISCONTINUED PER MEDLINE LINE GAS SAMPLING O2/CO2 LNG AD 13 FT NSL W/ TBNG FILTERLINE

## (undated) DEVICE — CATH IV AUTOGRD BC PNK 20GA 25 -- INSYTE

## (undated) DEVICE — TOWEL 4 PLY TISS 19X30 SUE WHT

## (undated) DEVICE — 1200 GUARD II KIT W/5MM TUBE W/O VAC TUBE: Brand: GUARDIAN

## (undated) DEVICE — HYPODERMIC SAFETY NEEDLE: Brand: MAGELLAN

## (undated) DEVICE — NON-REM POLYHESIVE PATIENT RETURN ELECTRODE: Brand: VALLEYLAB

## (undated) DEVICE — STRAINER URIN CALC RNL MSH -- CONVERT TO ITEM 357634

## (undated) DEVICE — SET ADMIN 16ML TBNG L100IN 2 Y INJ SITE IV PIGGY BK DISP (ORDER IN MULIPLES OF 48)

## (undated) DEVICE — CONTAINER SPEC 20 ML LID NEUT BUFF FORMALIN 10 % POLYPR STS